# Patient Record
Sex: MALE | NOT HISPANIC OR LATINO | Employment: FULL TIME | URBAN - METROPOLITAN AREA
[De-identification: names, ages, dates, MRNs, and addresses within clinical notes are randomized per-mention and may not be internally consistent; named-entity substitution may affect disease eponyms.]

---

## 2020-07-08 ENCOUNTER — OFFICE VISIT (OUTPATIENT)
Dept: FAMILY MEDICINE CLINIC | Facility: CLINIC | Age: 25
End: 2020-07-08

## 2020-07-08 VITALS
HEIGHT: 70 IN | HEART RATE: 72 BPM | TEMPERATURE: 98.1 F | DIASTOLIC BLOOD PRESSURE: 76 MMHG | SYSTOLIC BLOOD PRESSURE: 122 MMHG | RESPIRATION RATE: 16 BRPM | OXYGEN SATURATION: 96 % | WEIGHT: 223 LBS | BODY MASS INDEX: 31.92 KG/M2

## 2020-07-08 DIAGNOSIS — Z02.4 ENCOUNTER FOR CDL (COMMERCIAL DRIVING LICENSE) EXAM: Primary | ICD-10-CM

## 2020-07-08 LAB
SL AMB  POCT GLUCOSE, UA: NORMAL
SL AMB LEUKOCYTE ESTERASE,UA: NORMAL
SL AMB POCT BILIRUBIN,UA: NORMAL
SL AMB POCT BLOOD,UA: NORMAL
SL AMB POCT CLARITY,UA: CLEAR
SL AMB POCT COLOR,UA: YELLOW
SL AMB POCT KETONES,UA: NORMAL
SL AMB POCT NITRITE,UA: NORMAL
SL AMB POCT PH,UA: 5
SL AMB POCT SPECIFIC GRAVITY,UA: 1.02
SL AMB POCT URINE PROTEIN: NORMAL
SL AMB POCT UROBILINOGEN: NORMAL

## 2020-07-08 PROCEDURE — 99499 UNLISTED E&M SERVICE: CPT | Performed by: NURSE PRACTITIONER

## 2020-07-08 PROCEDURE — 81002 URINALYSIS NONAUTO W/O SCOPE: CPT | Performed by: NURSE PRACTITIONER

## 2020-07-08 PROCEDURE — 3008F BODY MASS INDEX DOCD: CPT | Performed by: NURSE PRACTITIONER

## 2020-07-08 NOTE — PROGRESS NOTES
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    Relevant Orders    POCT urine dip (Completed)            Subjective:      Patient ID: Erma Rockwell is a 26 y o  male  Patient is a 26year old male present for dot pe  The following portions of the patient's history were reviewed and updated as appropriate:   He has no past medical history on file  ,  does not have any pertinent problems on file  ,   has no past surgical history on file  ,  family history includes Hypertension in his maternal grandmother; No Known Problems in his father and mother  ,   reports that he has never smoked  He has never used smokeless tobacco  He reports that he drinks about 2 0 standard drinks of alcohol per week  He reports that he does not use drugs  ,  has No Known Allergies     No current outpatient medications on file  No current facility-administered medications for this visit  Review of Systems   Constitutional: Negative for appetite change, chills, fatigue and fever  HENT: Negative for congestion, ear pain, postnasal drip, rhinorrhea, sinus pain and sore throat  Eyes: Negative for pain, redness and visual disturbance  Respiratory: Negative for cough and shortness of breath  Cardiovascular: Negative for chest pain  Gastrointestinal: Negative for abdominal pain, diarrhea, nausea and vomiting  Genitourinary: Negative for dysuria and hematuria  Musculoskeletal: Negative for arthralgias  Skin: Negative  Allergic/Immunologic: Negative for environmental allergies and food allergies  Neurological: Negative for dizziness, seizures, weakness, light-headedness, numbness and headaches           Objective:  Vitals:    07/08/20 1453   BP: 122/76   BP Location: Left arm   Patient Position: Sitting   Cuff Size: Standard   Pulse: 72   Resp: 16   Temp: 98 1 °F (36 7 °C)   TempSrc: Temporal   SpO2: 96%   Weight: 101 kg (223 lb)   Height: 5' 10" (1 778 m)     Body mass index is 32 kg/m²  Physical Exam   Constitutional: He is oriented to person, place, and time  He appears well-developed and well-nourished  HENT:   Head: Normocephalic and atraumatic  Eyes: Pupils are equal, round, and reactive to light  Neck: Normal range of motion  Cardiovascular: Normal rate and regular rhythm  No murmur heard  Pulmonary/Chest: Effort normal and breath sounds normal    Abdominal: Soft  Bowel sounds are normal    Musculoskeletal: Normal range of motion  Neurological: He is alert and oriented to person, place, and time  Skin: Skin is warm  Psychiatric: He has a normal mood and affect  Nursing note and vitals reviewed

## 2020-07-08 NOTE — PATIENT INSTRUCTIONS
Medical Examiner's Certificate    I certify that I have examined David Castellanos  In accordance with the [employer] (80   78) and with knowledge of the driving duties, I find this person is qualified; and, if applicable, only when:      Qualified by operation of 49  64  The information I have provided regarding this physical examination is true and complete   A complete examination form with any attachment embodies my findings completely and correctly, and is on file in my office       _________________________________________  Sonali ServeELIZ  7/8/2020    __________________________________________ _______________________ _____   Signature of  s License No  State       Address of   [address] Date  7/8/2020

## 2020-10-07 ENCOUNTER — OFFICE VISIT (OUTPATIENT)
Dept: FAMILY MEDICINE CLINIC | Facility: CLINIC | Age: 25
End: 2020-10-07
Payer: COMMERCIAL

## 2020-10-07 ENCOUNTER — TELEPHONE (OUTPATIENT)
Dept: OTHER | Facility: OTHER | Age: 25
End: 2020-10-07

## 2020-10-07 VITALS
BODY MASS INDEX: 32.35 KG/M2 | RESPIRATION RATE: 16 BRPM | SYSTOLIC BLOOD PRESSURE: 104 MMHG | OXYGEN SATURATION: 98 % | HEIGHT: 70 IN | DIASTOLIC BLOOD PRESSURE: 70 MMHG | TEMPERATURE: 98 F | HEART RATE: 77 BPM | WEIGHT: 226 LBS

## 2020-10-07 DIAGNOSIS — K40.90 UNILATERAL INGUINAL HERNIA WITHOUT OBSTRUCTION OR GANGRENE, RECURRENCE NOT SPECIFIED: ICD-10-CM

## 2020-10-07 DIAGNOSIS — F41.9 ANXIETY: Primary | ICD-10-CM

## 2020-10-07 DIAGNOSIS — E66.9 OBESITY (BMI 30-39.9): ICD-10-CM

## 2020-10-07 PROCEDURE — 99203 OFFICE O/P NEW LOW 30 MIN: CPT | Performed by: NURSE PRACTITIONER

## 2020-10-07 RX ORDER — ESCITALOPRAM OXALATE 5 MG/1
5 TABLET ORAL DAILY
Qty: 30 TABLET | Refills: 1 | Status: SHIPPED | OUTPATIENT
Start: 2020-10-07 | End: 2020-10-08 | Stop reason: SDUPTHER

## 2020-10-08 DIAGNOSIS — F41.9 ANXIETY: ICD-10-CM

## 2020-10-09 RX ORDER — ESCITALOPRAM OXALATE 5 MG/1
5 TABLET ORAL DAILY
Qty: 30 TABLET | Refills: 1 | Status: SHIPPED | OUTPATIENT
Start: 2020-10-09 | End: 2020-11-11 | Stop reason: SDUPTHER

## 2020-10-12 ENCOUNTER — LAB (OUTPATIENT)
Dept: LAB | Facility: CLINIC | Age: 25
End: 2020-10-12
Payer: COMMERCIAL

## 2020-10-12 DIAGNOSIS — F41.9 ANXIETY: ICD-10-CM

## 2020-10-12 LAB
ALBUMIN SERPL BCP-MCNC: 4.4 G/DL (ref 3.5–5)
ALP SERPL-CCNC: 65 U/L (ref 46–116)
ALT SERPL W P-5'-P-CCNC: 63 U/L (ref 12–78)
ANION GAP SERPL CALCULATED.3IONS-SCNC: 7 MMOL/L (ref 4–13)
AST SERPL W P-5'-P-CCNC: 31 U/L (ref 5–45)
BASOPHILS # BLD AUTO: 0.03 THOUSANDS/ΜL (ref 0–0.1)
BASOPHILS NFR BLD AUTO: 0 % (ref 0–1)
BILIRUB SERPL-MCNC: 0.78 MG/DL (ref 0.2–1)
BUN SERPL-MCNC: 17 MG/DL (ref 5–25)
CALCIUM SERPL-MCNC: 9.6 MG/DL (ref 8.3–10.1)
CHLORIDE SERPL-SCNC: 105 MMOL/L (ref 100–108)
CO2 SERPL-SCNC: 28 MMOL/L (ref 21–32)
CREAT SERPL-MCNC: 1.03 MG/DL (ref 0.6–1.3)
EOSINOPHIL # BLD AUTO: 0.08 THOUSAND/ΜL (ref 0–0.61)
EOSINOPHIL NFR BLD AUTO: 1 % (ref 0–6)
ERYTHROCYTE [DISTWIDTH] IN BLOOD BY AUTOMATED COUNT: 12.5 % (ref 11.6–15.1)
GFR SERPL CREATININE-BSD FRML MDRD: 101 ML/MIN/1.73SQ M
GLUCOSE P FAST SERPL-MCNC: 86 MG/DL (ref 65–99)
HCT VFR BLD AUTO: 49.1 % (ref 36.5–49.3)
HGB BLD-MCNC: 16.5 G/DL (ref 12–17)
IMM GRANULOCYTES # BLD AUTO: 0.01 THOUSAND/UL (ref 0–0.2)
IMM GRANULOCYTES NFR BLD AUTO: 0 % (ref 0–2)
LYMPHOCYTES # BLD AUTO: 2.32 THOUSANDS/ΜL (ref 0.6–4.47)
LYMPHOCYTES NFR BLD AUTO: 34 % (ref 14–44)
MCH RBC QN AUTO: 29.8 PG (ref 26.8–34.3)
MCHC RBC AUTO-ENTMCNC: 33.6 G/DL (ref 31.4–37.4)
MCV RBC AUTO: 89 FL (ref 82–98)
MONOCYTES # BLD AUTO: 0.66 THOUSAND/ΜL (ref 0.17–1.22)
MONOCYTES NFR BLD AUTO: 10 % (ref 4–12)
NEUTROPHILS # BLD AUTO: 3.75 THOUSANDS/ΜL (ref 1.85–7.62)
NEUTS SEG NFR BLD AUTO: 55 % (ref 43–75)
NRBC BLD AUTO-RTO: 0 /100 WBCS
PLATELET # BLD AUTO: 230 THOUSANDS/UL (ref 149–390)
PMV BLD AUTO: 11 FL (ref 8.9–12.7)
POTASSIUM SERPL-SCNC: 4.8 MMOL/L (ref 3.5–5.3)
PROT SERPL-MCNC: 8 G/DL (ref 6.4–8.2)
RBC # BLD AUTO: 5.53 MILLION/UL (ref 3.88–5.62)
SODIUM SERPL-SCNC: 140 MMOL/L (ref 136–145)
TSH SERPL DL<=0.05 MIU/L-ACNC: 1.41 UIU/ML (ref 0.36–3.74)
WBC # BLD AUTO: 6.85 THOUSAND/UL (ref 4.31–10.16)

## 2020-10-12 PROCEDURE — 80053 COMPREHEN METABOLIC PANEL: CPT

## 2020-10-12 PROCEDURE — 36415 COLL VENOUS BLD VENIPUNCTURE: CPT

## 2020-10-12 PROCEDURE — 84443 ASSAY THYROID STIM HORMONE: CPT

## 2020-10-12 PROCEDURE — 85025 COMPLETE CBC W/AUTO DIFF WBC: CPT

## 2020-10-14 ENCOUNTER — OFFICE VISIT (OUTPATIENT)
Dept: SURGERY | Facility: CLINIC | Age: 25
End: 2020-10-14
Payer: COMMERCIAL

## 2020-10-14 VITALS — WEIGHT: 225 LBS | TEMPERATURE: 98.1 F | BODY MASS INDEX: 31.5 KG/M2 | HEIGHT: 71 IN

## 2020-10-14 DIAGNOSIS — K40.90 UNILATERAL INGUINAL HERNIA WITHOUT OBSTRUCTION OR GANGRENE, RECURRENCE NOT SPECIFIED: ICD-10-CM

## 2020-10-14 PROCEDURE — 99242 OFF/OP CONSLTJ NEW/EST SF 20: CPT | Performed by: SURGERY

## 2020-11-11 ENCOUNTER — OFFICE VISIT (OUTPATIENT)
Dept: FAMILY MEDICINE CLINIC | Facility: CLINIC | Age: 25
End: 2020-11-11
Payer: COMMERCIAL

## 2020-11-11 VITALS
BODY MASS INDEX: 32.03 KG/M2 | TEMPERATURE: 98 F | RESPIRATION RATE: 18 BRPM | WEIGHT: 228.8 LBS | HEIGHT: 71 IN | SYSTOLIC BLOOD PRESSURE: 120 MMHG | OXYGEN SATURATION: 98 % | DIASTOLIC BLOOD PRESSURE: 70 MMHG | HEART RATE: 92 BPM

## 2020-11-11 DIAGNOSIS — E66.9 OBESITY (BMI 30-39.9): ICD-10-CM

## 2020-11-11 DIAGNOSIS — F41.9 ANXIETY: Primary | ICD-10-CM

## 2020-11-11 PROCEDURE — 99213 OFFICE O/P EST LOW 20 MIN: CPT | Performed by: NURSE PRACTITIONER

## 2020-11-11 RX ORDER — ESCITALOPRAM OXALATE 5 MG/1
5 TABLET ORAL DAILY
Qty: 30 TABLET | Refills: 1 | Status: SHIPPED | OUTPATIENT
Start: 2020-11-11

## 2024-02-21 PROBLEM — Z02.4 ENCOUNTER FOR CDL (COMMERCIAL DRIVING LICENSE) EXAM: Status: RESOLVED | Noted: 2020-07-08 | Resolved: 2024-02-21

## 2025-02-03 ENCOUNTER — TELEMEDICINE (OUTPATIENT)
Dept: OTHER | Facility: HOSPITAL | Age: 30
End: 2025-02-03
Payer: COMMERCIAL

## 2025-02-03 DIAGNOSIS — R68.89 FLU-LIKE SYMPTOMS: Primary | ICD-10-CM

## 2025-02-03 PROCEDURE — 99213 OFFICE O/P EST LOW 20 MIN: CPT | Performed by: NURSE PRACTITIONER

## 2025-02-03 NOTE — PROGRESS NOTES
Virtual Regular Visit  Name: Alonzo Hernandez      : 1995      MRN: [id number]  Encounter Provider: ELIZ Rahman  Encounter Date: 2/3/2025   Encounter department: VIRTUAL CARE       Verification of patient location:  Patient is located at Home in the following state in which I hold an active license [place] :  Assessment & Plan        Encounter provider ELIZ Rahman    The patient was identified by name and date of birth. Alonzo Hernandez was informed that this is a telemedicine visit and that the visit is being conducted through the Epic Embedded platform. He agrees to proceed..  My office door was closed. No one else was in the room.  He acknowledged consent and understanding of privacy and security of the video platform. The patient has agreed to participate and understands they can discontinue the visit at any time.    Patient is aware this is a billable service.     History was obtained from: History obtained from: patient  History of Present Illness     This is a 29 year old male here today for video visit.  He had flu like symptoms last week.  He states he had cough, mucous and chills.  He states he is now feeling better now.  He states he has some congestion and slight cough.  No fevers. He needs a note       Review of Systems   Constitutional: Negative.    HENT: Negative.     Respiratory: Negative.     Cardiovascular: Negative.    Skin: Negative.    Neurological: Negative.    Psychiatric/Behavioral: Negative.         Objective   There were no vitals taken for this visit.    Physical Exam  Constitutional:       General: He is not in acute distress.     Appearance: Normal appearance. He is not ill-appearing or toxic-appearing.   HENT:      Head: Normocephalic and atraumatic.      Nose: Congestion and rhinorrhea present.   Pulmonary:      Effort: Pulmonary effort is normal. No respiratory distress.   Neurological:      Mental Status: He is alert and oriented to person, place, and time.    Psychiatric:         Mood and Affect: Mood normal.         Behavior: Behavior normal.         Thought Content: Thought content normal.         Judgment: Judgment normal.         Visit Time  Total Visit Duration: 6 minutes not including the time spent for establishing the audio/video connection.

## 2025-02-03 NOTE — LETTER
February 3, 2025     Patient: Alonzo Hernandez   YOB: 1995   Date of Visit: 2/3/2025       To Whom it May Concern:    Alonzo Hernandez is under my professional care. He was seen virtually on 2/3/2025. He may return to work on 02/3/202 .  Please excuse on 1/29-1/31.     If you have any questions or concerns, please don't hesitate to call.         Sincerely,          ELIZ Rahman        CC: No Recipients

## 2025-02-03 NOTE — PATIENT INSTRUCTIONS
As we discussed your illness is viral.  No antibiotics are indicated at this time.  Rest and drink extra fluids.  OTC cough and cold medications as needed.  Tylenol or Motrin as needed for pain or fever.  Salt water gargles and throat lozenges for sore throat.  Follow up with PCP if no improvement.  Go to ER with worsening symptoms.      Cold Symptoms   WHAT YOU NEED TO KNOW:   A cold is an infection caused by a virus. The infection causes your upper respiratory system to become inflamed. Common symptoms of a cold include sneezing, dry throat, a stuffy nose, headache, watery eyes, and a cough. Your cough may be dry, or you may cough up mucus. You may also have muscle aches, joint pain, and tiredness. Rarely, you may have a fever. Most colds go away without treatment.  DISCHARGE INSTRUCTIONS:   Return to the emergency department if:   You have increased tiredness and weakness.    You are unable to eat.     Your heart is beating much faster than usual for you.     You see white spots in the back of your throat and your neck is swollen and sore to the touch.     You see pinpoint or larger reddish-purple dots on your skin.  Contact your healthcare provider if:   You have a fever higher than 102°F (38.9°C).    You have new or worsening shortness of breath.     You have thick nasal drainage for more than 2 days.     Your symptoms do not improve or get worse within 5 days.     You have questions or concerns about your condition or care.  Medicines:  The following medicines may be suggested by your healthcare provider to decrease your cold symptoms. These medicines are available without a doctor's order. Ask which medicines to take and when to take them. Follow directions.  NSAIDs or acetaminophen  help to bring down a fever or decrease pain.    Decongestants  help decrease nasal stuffiness.     Antihistamines  help decrease sneezing and a runny nose.     Cough suppressants  help decrease how much you cough.    Expectorants   help loosen mucus so you can cough it up.    Take your medicine as directed.  Contact your healthcare provider if you think your medicine is not helping or if you have side effects. Tell him of her if you are allergic to any medicine. Keep a list of the medicines, vitamins, and herbs you take. Include the amounts, and when and why you take them. Bring the list or the pill bottles to follow-up visits. Carry your medicine list with you in case of an emergency.  Symptom relief:  The following may help relieve cold symptoms, such as a dry throat and congestion:  Gargle with mouthwash or warm salt water as directed.     Suck on throat lozenges or hard candy.     Use a cold or warm vaporizer or humidifier to ease your breathing.     Rest for at least 2 days and then as needed to decrease tiredness and weakness.     Use petroleum based jelly around your nostrils to decrease irritation from blowing your nose.  Drink liquids:  Liquids will help thin and loosen thick mucus so you can cough it up. Liquids will also keep you hydrated. Ask your healthcare provider which liquids are best for you and how much to drink each day.  Prevent the spread of germs:  You can spread your cold germs to others for at least 3 days after your symptoms start. Wash your hands often. Do not share items, such as eating utensils. Cover your nose and mouth when you cough or sneeze using the crook of your elbow instead of your hands. Throw used tissues in the garbage.  Do not smoke:  Smoking may worsen your symptoms and increase the length of time you feel sick. Talk with your healthcare provider if you need help to stop smoking.  Follow up with your healthcare provider as directed:  Write down your questions so you remember to ask them during your visits.   © 2017 [employer] Information is for End User's use only and may not be sold, redistributed or otherwise used for commercial purposes. All illustrations and images included in  CareNotes® are the copyrighted property of [employer], [employer] or [employer].  The above information is an  only. It is not intended as medical advice for individual conditions or treatments. Talk to your doctor, nurse or pharmacist before following any medical regimen to see if it is safe and effective for you.